# Patient Record
Sex: MALE | Race: WHITE | NOT HISPANIC OR LATINO | URBAN - METROPOLITAN AREA
[De-identification: names, ages, dates, MRNs, and addresses within clinical notes are randomized per-mention and may not be internally consistent; named-entity substitution may affect disease eponyms.]

---
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
## 2024-05-20 ENCOUNTER — HOSPITAL ENCOUNTER (EMERGENCY)
Facility: HOSPITAL | Age: 19
Discharge: HOME | End: 2024-05-20
Attending: EMERGENCY MEDICINE

## 2024-05-20 VITALS
BODY MASS INDEX: 21.69 KG/M2 | TEMPERATURE: 98 F | RESPIRATION RATE: 16 BRPM | WEIGHT: 135 LBS | SYSTOLIC BLOOD PRESSURE: 114 MMHG | HEART RATE: 78 BPM | HEIGHT: 66 IN | OXYGEN SATURATION: 98 % | DIASTOLIC BLOOD PRESSURE: 68 MMHG

## 2024-05-20 DIAGNOSIS — B08.5 PHARYNGITIS DUE TO COXSACKIE VIRUS: Primary | ICD-10-CM

## 2024-05-20 PROBLEM — L25.9 CONTACT DERMATITIS: Status: ACTIVE | Noted: 2024-05-20

## 2024-05-20 PROBLEM — R59.1 LYMPHADENOPATHY: Status: ACTIVE | Noted: 2024-05-20

## 2024-05-20 PROBLEM — N60.09 CYST OF BREAST: Status: ACTIVE | Noted: 2024-05-20

## 2024-05-20 LAB
BUN SERPL-MCNC: 18 MG/DL (ref 6–30)
CHLORIDE SERPL-SCNC: 104 MMOL/L (ref 95–110)
CREAT SERPL-MCNC: 0.9 MG/DL (ref 0.5–1.4)
GLUCOSE SERPL-MCNC: 87 MG/DL (ref 70–110)
GROUP A STREP, MOLECULAR: NEGATIVE
HCT VFR BLD CALC: 44 %PCV (ref 36–54)
POC IONIZED CALCIUM: 1.19 MMOL/L (ref 1.06–1.42)
POC TCO2 (MEASURED): 25 MMOL/L (ref 23–29)
POTASSIUM BLD-SCNC: 4 MMOL/L (ref 3.5–5.1)
SAMPLE: NORMAL
SODIUM BLD-SCNC: 140 MMOL/L (ref 136–145)

## 2024-05-20 PROCEDURE — 25500020 PHARM REV CODE 255: Performed by: EMERGENCY MEDICINE

## 2024-05-20 PROCEDURE — 87651 STREP A DNA AMP PROBE: CPT | Performed by: PHYSICIAN ASSISTANT

## 2024-05-20 PROCEDURE — 99285 EMERGENCY DEPT VISIT HI MDM: CPT | Mod: 25

## 2024-05-20 PROCEDURE — 80047 BASIC METABLC PNL IONIZED CA: CPT

## 2024-05-20 PROCEDURE — 25000003 PHARM REV CODE 250: Performed by: PHYSICIAN ASSISTANT

## 2024-05-20 PROCEDURE — 63600175 PHARM REV CODE 636 W HCPCS: Performed by: PHYSICIAN ASSISTANT

## 2024-05-20 PROCEDURE — 96374 THER/PROPH/DIAG INJ IV PUSH: CPT

## 2024-05-20 RX ORDER — KETOROLAC TROMETHAMINE 10 MG/1
10 TABLET, FILM COATED ORAL
Status: COMPLETED | OUTPATIENT
Start: 2024-05-20 | End: 2024-05-20

## 2024-05-20 RX ORDER — KETOROLAC TROMETHAMINE 10 MG/1
10 TABLET, FILM COATED ORAL EVERY 6 HOURS PRN
Qty: 20 TABLET | Refills: 0 | Status: SHIPPED | OUTPATIENT
Start: 2024-05-20 | End: 2024-05-25

## 2024-05-20 RX ORDER — KETOROLAC TROMETHAMINE 30 MG/ML
10 INJECTION, SOLUTION INTRAMUSCULAR; INTRAVENOUS
Status: COMPLETED | OUTPATIENT
Start: 2024-05-20 | End: 2024-05-20

## 2024-05-20 RX ORDER — ACETAMINOPHEN 500 MG
1000 TABLET ORAL
Status: COMPLETED | OUTPATIENT
Start: 2024-05-20 | End: 2024-05-20

## 2024-05-20 RX ADMIN — ACETAMINOPHEN 1000 MG: 500 TABLET ORAL at 11:05

## 2024-05-20 RX ADMIN — IOHEXOL 75 ML: 350 INJECTION, SOLUTION INTRAVENOUS at 01:05

## 2024-05-20 RX ADMIN — DIPHENHYDRAMINE HYDROCHLORIDE 10 ML: 25 SOLUTION ORAL at 11:05

## 2024-05-20 RX ADMIN — KETOROLAC TROMETHAMINE 10 MG: 10 TABLET, FILM COATED ORAL at 11:05

## 2024-05-20 RX ADMIN — DEXAMETHASONE 6 MG: 4 TABLET ORAL at 11:05

## 2024-05-20 RX ADMIN — KETOROLAC TROMETHAMINE 10 MG: 30 INJECTION, SOLUTION INTRAMUSCULAR at 01:05

## 2024-05-20 NOTE — ED NOTES
I-STAT Chem-8+ Results:   Value Reference Range   Sodium 140 136-145 mmol/L   Potassium  4.0 3.5-5.1 mmol/L   Chloride 104  mmol/L   Ionized Calcium 1.19 1.06-1.42 mmol/L   CO2 (measured) 25 23-29 mmol/L   Glucose 87  mg/dL   BUN 18 6-30 mg/dL   Creatinine 0.9 0.5-1.4 mg/dL   Hematocrit 44 36-54%

## 2024-05-20 NOTE — Clinical Note
"Popeye Mauriceony" Paras was seen and treated in our emergency department on 5/20/2024.  He may return to school on 05/22/2024.      If you have any questions or concerns, please don't hesitate to call.      Hanna Fox PA-C"

## 2024-05-20 NOTE — Clinical Note
"Popeye Mauriceony" Paras was seen and treated in our emergency department on 5/20/2024.  He may return to work on 05/23/2024.       If you have any questions or concerns, please don't hesitate to call.      Hanna Fox PA-C"

## 2024-05-20 NOTE — DISCHARGE INSTRUCTIONS
Diagnosis:  Throat infection due to hand-foot and mouth    I think your symptoms are due to hand-foot and mouth disease.  Your CT scan did not show any abscesses in your strep test was negative.  Unfortunately, there is no specific treatment for this but I am prescribing medicine for pain and numbing that you can use as needed.  You can use over-the-counter medications such as Chloraseptic, cough drops, etc. as well.    Please schedule an appointment with your primary care doctor for follow-up. If you start to have any new or worsening symptoms, please come back to the emergency department.

## 2024-05-20 NOTE — ED PROVIDER NOTES
Encounter Date: 5/20/2024       History     Chief Complaint   Patient presents with    Sore Throat     Pus and blisters, slight fever last night     18-year-old male with no significant past medical history presents for sore throat which started last night.  Reports associated low-grade fever, swollen glands and ulceration on his uvula.  Reports painful swallowing and hoarse voice.  He denies ability to swallow, neck swelling or ulcerations on his hands, feet or genitals.  He does report that his significant other was exposed to hand-foot-mouth working with children.  No known sick contacts.  He is s/p tonsillectomy.      Review of patient's allergies indicates:  No Known Allergies  History reviewed. No pertinent past medical history.  History reviewed. No pertinent surgical history.  No family history on file.  Social History     Tobacco Use    Smoking status: Never    Smokeless tobacco: Never   Substance Use Topics    Drug use: Never     Review of Systems    Physical Exam     Initial Vitals [05/20/24 0942]   BP Pulse Resp Temp SpO2   113/71 82 18 98.2 °F (36.8 °C) 98 %      MAP       --         Physical Exam    Nursing note and vitals reviewed.  Constitutional: He appears well-developed and well-nourished.   HENT:   Head: Normocephalic and atraumatic.   Mouth/Throat: Uvula is midline. No trismus in the jaw. No uvula swelling.   Tonsils removed, there is some ulceration on the left lateral side of the uvula and some ulceration on the side of the tongue.  Normal voice, tolerating secretions, no trismus   Neck:   Tender cervical lymphadenopathy   Cardiovascular:  Normal rate, regular rhythm, normal heart sounds and intact distal pulses.           Pulmonary/Chest: Breath sounds normal.     Lymphadenopathy:     He has cervical adenopathy.   Neurological: He is alert and oriented to person, place, and time.         ED Course   Procedures  Labs Reviewed   GROUP A STREP, MOLECULAR   ISTAT PROCEDURE          Imaging  Results              CT Soft Tissue Neck With Contrast (Final result)  Result time 05/20/24 13:27:29      Final result by Esau Conway DO (05/20/24 13:27:29)                   Impression:      No peripheral enhancing collection throughout the soft tissues of the neck to suggest abscess    There is few prominent right submental and bilateral retropharyngeal lymph nodes likely reactive in light of history.  Clinical correlation and follow-up advised.  Please note ulcerated lesions in the oral cavity are below CT resolution and clinical correlation advised.    Electronically signed by resident: Demetri Sevilla  Date:    05/20/2024  Time:    13:01    Electronically signed by: Esau Conway DO  Date:    05/20/2024  Time:    13:27               Narrative:    EXAMINATION:  CT SOFT TISSUE NECK WITH CONTRAST    CLINICAL HISTORY:  Soft tissue swelling, infection suspected, neck xray done;    TECHNIQUE:  Axial CT images obtained throughout the region of the neck after the administration of 75 ml omnipaque 350 intravenous contrast. Axial, sagittal and coronal reconstructions were performed.    COMPARISON:  No priors.    FINDINGS:  No abnormal soft tissue mass is identified within the neck. There is no evidence of pathologic lymph node enlargement.  Few prominent right-sided submental lymph nodes as well as prominent retropharyngeal lymph nodes measuring 0.7 cm on the right and 0.5 cm on the left.  No definite lymphadenopathy...  No organized fluid collection.    Pharynx/larynx: Nasopharynx, oropharynx, hypopharynx larynx trachea unremarkable..  The parotid, submandibular, and thyroid glands without focal lesion    Vasculature grossly patent throughout the neck.    No consolidation visualized lung apices..  No evidence for acute fracture or subluxation cervical spine    No peripheral enhancing collection throughout the soft tissues of the neck to suggest abscess as clinically question.                                        Medications   (Magic mouthwash) 1:1:1 diphenhydrAMINE(Benadryl) 12.5mg/5ml liq, aluminum & magnesium hydroxide-simethicone (Maalox), LIDOcaine viscous 2% (10 mLs Swish & Spit Given 5/20/24 1138)   acetaminophen tablet 1,000 mg (1,000 mg Oral Given 5/20/24 1137)   ketorolac tablet 10 mg (10 mg Oral Given 5/20/24 1138)   dexAMETHasone tablet 6 mg (6 mg Oral Given 5/20/24 1137)   iohexoL (OMNIPAQUE 350) injection 75 mL (75 mLs Intravenous Given 5/20/24 1300)   ketorolac injection 9.999 mg (9.999 mg Intravenous Given 5/20/24 1341)     Medical Decision Making  18-year-old male presenting for sore throat.  His vitals are, he appears uncomfortable but nontoxic.    Differential diagnosis:  Coxsackie virus   Strep pharyngitis   Viral pharyngitis   He is s/p tonsillectomy, no clinical signs of tonsillar abscess  Low suspicion for deep space neck infection    Strep test is negative.  CT without deep space neck infection.  Patient is feeling better after therapy.  Suspect pain secondary to ulceration likely from Coxsackie virus. Stressed the importance of follow-up, strict ED return precautions given.  Patient voiced understanding and is comfortable with discharge.       Amount and/or Complexity of Data Reviewed  Labs:  Decision-making details documented in ED Course.  Radiology: ordered.    Risk  OTC drugs.  Prescription drug management.               ED Course as of 05/20/24 1622   Mon May 20, 2024   1201 Group A Strep, Molecular: Negative [CC]   1202 Patient reports no significant improvement of pain after therapy, reports sensation of object in his lower throat.  Will do CT soft tissue neck [CC]      ED Course User Index  [CC] Hanna Fox PA-C                           Clinical Impression:  Final diagnoses:  [B08.5] Pharyngitis due to Coxsackie virus (Primary)          ED Disposition Condition    Discharge Stable          ED Prescriptions       Medication Sig Dispense Start Date End Date Auth. Provider     diphenhydrAMINE-aluminum-magnesium hydroxide-simethicone-LIDOcaine viscous HCl 2% Swish and spit 10 mLs every 4 (four) hours as needed (Sore throat). 1 each 5/20/2024 -- Hanna Fox PA-C    ketorolac (TORADOL) 10 mg tablet Take 1 tablet (10 mg total) by mouth every 6 (six) hours as needed for Pain. 20 tablet 5/20/2024 5/25/2024 Hanna Fox PA-C          Follow-up Information       Follow up With Specialties Details Why Contact Info    Alberto Garland II, MD Pediatric Neurology Schedule an appointment as soon as possible for a visit in 1 week  [address]  [place] [address]  [phone]      [facility] - Emergency Dept Emergency Medicine Go to   [address] [facility] [address] [phone]             Hanna Fox PA-C  05/20/24 3792

## 2024-05-20 NOTE — ED TRIAGE NOTES
Pt arriving to ED c/o Pus and blisters, slight fever last night. States girlfriend was exposed to hand foot and mouth disease

## 2024-05-20 NOTE — ED NOTES
Patient identifiers verified and correct for Popeye Crain   LOC: The patient is awake, alert and aware of environment with an appropriate affect, the patient is oriented x 3 and speaking appropriately.   APPEARANCE: Patient appears comfortable and in no acute distress. Pt c/o ulcers to uvula   SKIN: The skin is warm and dry, color consistent with ethnicity, patient has normal skin turgor and moist mucus membranes, skin intact, no breakdown or bruising noted.   MUSCULOSKELETAL: Patient moving all extremities spontaneously, no swelling noted.  RESPIRATORY: Airway is open and patent, respirations are spontaneous, patient has a normal effort and rate, no accessory muscle use noted,  O2 sats noted at 98% on room air.  CARDIAC: Patient has a normal rate, no edema noted, capillary refill < 3 seconds.   GASTRO: Soft and non tender to palpation, no distention noted, normoactive bowel sounds present in all four quadrants. Pt states bowel movements have been regular.  : Pt denies any pain or frequency with urination.  NEURO: Pt opens eyes spontaneously, behavior appropriate to situation, follows commands, facial expression symmetrical, bilateral hand grasp equal and even, purposeful motor response noted, normal sensation in all extremities when touched with a finger.